# Patient Record
Sex: FEMALE | Race: WHITE | NOT HISPANIC OR LATINO | Employment: FULL TIME | URBAN - METROPOLITAN AREA
[De-identification: names, ages, dates, MRNs, and addresses within clinical notes are randomized per-mention and may not be internally consistent; named-entity substitution may affect disease eponyms.]

---

## 2024-01-10 ENCOUNTER — OFFICE VISIT (OUTPATIENT)
Dept: URGENT CARE | Facility: CLINIC | Age: 44
End: 2024-01-10
Payer: COMMERCIAL

## 2024-01-10 VITALS
OXYGEN SATURATION: 100 % | HEART RATE: 84 BPM | TEMPERATURE: 98.4 F | SYSTOLIC BLOOD PRESSURE: 126 MMHG | DIASTOLIC BLOOD PRESSURE: 83 MMHG | RESPIRATION RATE: 14 BRPM

## 2024-01-10 DIAGNOSIS — J02.9 EXUDATIVE PHARYNGITIS: Primary | ICD-10-CM

## 2024-01-10 LAB — POC RAPID STREP: NEGATIVE

## 2024-01-10 PROCEDURE — 99212 OFFICE O/P EST SF 10 MIN: CPT | Mod: 25 | Performed by: PHYSICIAN ASSISTANT

## 2024-01-10 PROCEDURE — 87880 STREP A ASSAY W/OPTIC: CPT | Performed by: PHYSICIAN ASSISTANT

## 2024-01-10 RX ORDER — AMOXICILLIN 500 MG/1
500 CAPSULE ORAL 2 TIMES DAILY
Qty: 20 CAPSULE | Refills: 0 | Status: SHIPPED | OUTPATIENT
Start: 2024-01-10 | End: 2024-01-20

## 2024-01-10 NOTE — PROGRESS NOTES
[facility] ERICK NOTE:      Name: Doris Mims, 43 y.o.    CSN:[id number]   MRN:[id number]    PCP: No primary care provider on file.    ALL:    Allergies   Allergen Reactions    Sulfamethoxazole-Trimethoprim Other     HEART PALPITATIONS       History:    Chief Complaint: Nausea, Headache, and FATIGUE (THROAT SWELLING X THURSDAY)    Encounter Date: 1/10/2024  12:15hrs    HPI: The history was obtained from the patient. Doris is a 43 y.o. female, who presents with a chief complaint of Nausea, Headache, and FATIGUE (THROAT SWELLING X THURSDAY)     Has recurrent episodes of strep, she works as a respiratory therapist and has had exposures to various viral conditions as well, she denies any notable sinus pressure or congestion but does endorse some left-sided neck aching, moderate exudative process affecting the tonsil regions and some pharyngitis with difficulty in swallowing some solid foods but not saliva or thin liquids.    PMHx:    History reviewed. No pertinent past medical history.           Current Outpatient Medications   Medication Sig Dispense Refill    amoxicillin (Amoxil) 500 mg capsule Take 1 capsule (500 mg) by mouth 2 times a day for 10 days. 20 capsule 0     No current facility-administered medications for this visit.         PMSx:  History reviewed. No pertinent surgical history.    Fam Hx: No family history on file.    SOC. Hx:     Social History     Socioeconomic History    Marital status:      Spouse name: Not on file    Number of children: Not on file    Years of education: Not on file    Highest education level: Not on file   Occupational History    Not on file   Tobacco Use    Smoking status: Never    Smokeless tobacco: Never   Substance and Sexual Activity    Alcohol use: Not on file    Drug use: Not on file    Sexual activity: Not on file   Other Topics Concern    Not on file   Social History Narrative    Not on file     Social Determinants of Health      Financial Resource Strain: Not on file   Food Insecurity: Not on file   Transportation Needs: Not on file   Physical Activity: Not on file   Stress: Not on file   Social Connections: Not on file   Intimate Partner Violence: Not on file   Housing Stability: Not on file         Vitals:    01/10/24 1217   BP: 126/83   Pulse: 84   Resp: 14   Temp: 36.9 °C (98.4 °F)   SpO2: 100%                Physical Exam  Constitutional:       Appearance: Normal appearance. She is normal weight.   HENT:      Head: Normocephalic and atraumatic.      Nose: Nose normal.      Mouth/Throat:      Mouth: Mucous membranes are moist.      Pharynx: Uvula midline. Oropharyngeal exudate and posterior oropharyngeal erythema present. No uvula swelling.      Tonsils: Tonsillar exudate present. No tonsillar abscesses. 2+ on the left.   Eyes:      Extraocular Movements: Extraocular movements intact.      Comments: Wearing glasses.   Cardiovascular:      Rate and Rhythm: Normal rate.   Pulmonary:      Effort: Pulmonary effort is normal.   Musculoskeletal:         General: Normal range of motion.   Skin:     General: Skin is warm.   Neurological:      Mental Status: She is alert and oriented to person, place, and time.   Psychiatric:         Behavior: Behavior normal.           LABORATORY @ RADIOLOGICAL IMAGING (if done):     Results for orders placed or performed in visit on 01/10/24 (from the past 24 hour(s))   POCT rapid strep A manually resulted   Result Value Ref Range    POC Rapid Strep Negative Negative        COURSE/MEDICAL DECISION MAKING:    Doris is a 43 y.o., who presents with a working diagnosis of   1. Exudative pharyngitis     with a differential to include: Influenza, parainfluenza, rhinovirus, adenovirus, metapneumovirus, coronavirus, COVID-19, postnasal drip, strep pharyngitis, GERD, retropharyngeal abscess, tonsillitis, adenitis, seasonal allergies      Given the timeline and the exudative process and significant pain and  lymphadenopathy patient will be treated empirically with amoxicillin despite rapid strep testing.  She was agreeable discharge.        Jorge Alegria PA-C   Advanced Practice Provider  [facility]